# Patient Record
Sex: MALE | Race: BLACK OR AFRICAN AMERICAN | NOT HISPANIC OR LATINO
[De-identification: names, ages, dates, MRNs, and addresses within clinical notes are randomized per-mention and may not be internally consistent; named-entity substitution may affect disease eponyms.]

---
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
## 2024-09-26 ENCOUNTER — APPOINTMENT (OUTPATIENT)
Age: 19
End: 2024-09-26
Payer: MEDICAID

## 2024-09-26 PROCEDURE — SCREENING: CUSTOM

## 2024-09-26 PROCEDURE — D0330 PANORAMIC RADIOGRAPHIC IMAGE: CPT

## 2024-12-11 ENCOUNTER — APPOINTMENT (OUTPATIENT)
Age: 19
End: 2024-12-11
Payer: MEDICAID

## 2024-12-11 PROCEDURE — D1110 PROPHYLAXIS - ADULT: CPT

## 2024-12-11 PROCEDURE — D0150: CPT

## 2024-12-11 PROCEDURE — D0210: CPT
